# Patient Record
Sex: MALE | Race: WHITE
[De-identification: names, ages, dates, MRNs, and addresses within clinical notes are randomized per-mention and may not be internally consistent; named-entity substitution may affect disease eponyms.]

---

## 2019-07-23 ENCOUNTER — APPOINTMENT (OUTPATIENT)
Dept: ELECTROPHYSIOLOGY | Facility: CLINIC | Age: 74
End: 2019-07-23
Payer: MEDICARE

## 2019-07-23 VITALS
BODY MASS INDEX: 24.62 KG/M2 | HEIGHT: 70 IN | DIASTOLIC BLOOD PRESSURE: 73 MMHG | WEIGHT: 172 LBS | OXYGEN SATURATION: 98 % | HEART RATE: 76 BPM | SYSTOLIC BLOOD PRESSURE: 142 MMHG

## 2019-07-23 DIAGNOSIS — F17.200 NICOTINE DEPENDENCE, UNSPECIFIED, UNCOMPLICATED: ICD-10-CM

## 2019-07-23 DIAGNOSIS — M32.9 SYSTEMIC LUPUS ERYTHEMATOSUS, UNSPECIFIED: ICD-10-CM

## 2019-07-23 DIAGNOSIS — I48.0 PAROXYSMAL ATRIAL FIBRILLATION: ICD-10-CM
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
## 2019-07-23 DIAGNOSIS — Z82.49 FAMILY HISTORY OF ISCHEMIC HEART DISEASE AND OTHER DISEASES OF THE CIRCULATORY SYSTEM: ICD-10-CM

## 2019-07-23 DIAGNOSIS — Z78.9 OTHER SPECIFIED HEALTH STATUS: ICD-10-CM

## 2019-07-23 DIAGNOSIS — R42 DIZZINESS AND GIDDINESS: ICD-10-CM

## 2019-07-23 DIAGNOSIS — I49.8 OTHER SPECIFIED CARDIAC ARRHYTHMIAS: ICD-10-CM

## 2019-07-23 PROCEDURE — 93000 ELECTROCARDIOGRAM COMPLETE: CPT

## 2019-07-23 PROCEDURE — 99205 OFFICE O/P NEW HI 60 MIN: CPT

## 2019-07-23 RX ORDER — LEVETIRACETAM 500 MG/1
500 TABLET, FILM COATED ORAL
Qty: 14 | Refills: 0 | Status: ACTIVE | COMMUNITY

## 2019-07-23 RX ORDER — VALSARTAN 80 MG/1
80 TABLET, COATED ORAL DAILY
Qty: 90 | Refills: 3 | Status: ACTIVE | COMMUNITY

## 2019-07-23 RX ORDER — HYDROXYCHLOROQUINE SULFATE 200 MG/1
200 TABLET, FILM COATED ORAL DAILY
Refills: 0 | Status: ACTIVE | COMMUNITY

## 2019-07-23 NOTE — REASON FOR VISIT
[Consultation] : a consultation regarding [Atrial Fibrillation] : atrial fibrillation [FreeTextEntry1] : Watchman device evaluation

## 2019-07-23 NOTE — PHYSICAL EXAM
[General Appearance - Well Nourished] : well nourished [Normal Conjunctiva] : the conjunctiva exhibited no abnormalities [General Appearance - Well Developed] : well developed [Normal Oral Mucosa] : normal oral mucosa [Normal Oropharynx] : normal oropharynx [Heart Sounds] : normal S1 and S2 [Heart Rate And Rhythm] : heart rate and rhythm were normal [Murmurs] : no murmurs present [Edema] : no peripheral edema present [Respiration, Rhythm And Depth] : normal respiratory rhythm and effort [Bibasilar Rales/Crackles] : bibasilar rales [Abdomen Soft] : soft [Bowel Sounds] : normal bowel sounds [Abdomen Tenderness] : non-tender [Abnormal Walk] : normal gait [Nail Clubbing] : no clubbing of the fingernails [Skin Color & Pigmentation] : normal skin color and pigmentation [Cyanosis, Localized] : no localized cyanosis [Skin Turgor] : normal skin turgor [] : no rash [Affect] : the affect was normal [Impaired Insight] : insight and judgment were intact [Oriented To Time, Place, And Person] : oriented to person, place, and time [Mood] : the mood was normal [No Anxiety] : not feeling anxious [FreeTextEntry1] : No JVD

## 2019-07-23 NOTE — HISTORY OF PRESENT ILLNESS
[FreeTextEntry1] : 75 y/o male with history of AAA s/p grafting, cerebral aneurysm that ruptured in 2006 and second non-ruptured aneurysm noted at that time, cardiomyopathy, BPH, CKD, recurrent falls/syncope s/p ILR placement in 2017, and pAfib who presents today for evaluation for watchman placement. \par \par The patient is followed by Dr. Soto and Dr Leal. They felt that he was not a good long term AC candidate given his recurrent falls, 4 over the last year, and cerebral aneurysms. The patient states that he only takes an ASA. He does not recall being on AC. He denies a history of bleeding. He states he was falling often. The falls were all similar, occur when he was standing or walking, felt like a light bulb would go out and he would fall. when he would reach the ground he would feel fine. This resulted in an ILR being placed, His ILR interrogation today shows a 16 hour episode of atrial fibrillation. The patient denies any symptoms of CP, SOB, MOLINA, or palpitations. He does not feel his afib.

## 2019-07-23 NOTE — ASSESSMENT
[FreeTextEntry1] : IMPRESSION: \par 73 y/o male with history of AAA s/p grafting, cerebral aneurysm that ruptured in 2006 and second non-ruptured aneurysm noted at that time, cardiomyopathy, BPH, CKD, recurrent falls/syncope s/p ILR placement in 2017, and pAfib who presents today for evaluation for Watchman placement. The patient is at risk for life altering bleeding on full AC long term given his falls and cerebral aneurysms. He would benefit form a Watchman device. 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 However, his ILR does not show any evidence of heart block. He is at increased risk of CHB in the future. \par \par PLAN:\par - Agree with Watchman as above. Screening BRANDO ordered to evaluate the DONNA. \par - Will proceed depending on BRANDO results.

## 2019-08-19 ENCOUNTER — OUTPATIENT (OUTPATIENT)
Dept: OUTPATIENT SERVICES | Facility: HOSPITAL | Age: 74
LOS: 1 days | End: 2019-08-19
Payer: MEDICARE

## 2019-08-19 ENCOUNTER — APPOINTMENT (OUTPATIENT)
Dept: CV DIAGNOSITCS | Facility: HOSPITAL | Age: 74
End: 2019-08-19

## 2019-08-19 DIAGNOSIS — I25.10 ATHEROSCLEROTIC HEART DISEASE OF NATIVE CORONARY ARTERY WITHOUT ANGINA PECTORIS: ICD-10-CM

## 2019-08-19 PROCEDURE — 93306 TTE W/DOPPLER COMPLETE: CPT | Mod: 26

## 2019-08-19 PROCEDURE — 93312 ECHO TRANSESOPHAGEAL: CPT

## 2019-08-19 PROCEDURE — 93312 ECHO TRANSESOPHAGEAL: CPT | Mod: 26

## 2019-08-19 PROCEDURE — 93306 TTE W/DOPPLER COMPLETE: CPT

## 2019-09-11 ENCOUNTER — APPOINTMENT (OUTPATIENT)
Dept: CV DIAGNOSITCS | Facility: HOSPITAL | Age: 74
End: 2019-09-11

## 2019-10-23 ENCOUNTER — APPOINTMENT (OUTPATIENT)
Dept: CV DIAGNOSITCS | Facility: HOSPITAL | Age: 74
End: 2019-10-23

## 2019-10-23 ENCOUNTER — TRANSCRIPTION ENCOUNTER (OUTPATIENT)
Age: 74
End: 2019-10-23

## 2019-10-23 ENCOUNTER — INPATIENT (INPATIENT)
Facility: HOSPITAL | Age: 74
LOS: 0 days | Discharge: HOME | DRG: 274 | End: 2019-10-24
Attending: INTERNAL MEDICINE | Admitting: INTERNAL MEDICINE
Payer: MEDICARE

## 2019-10-23 VITALS
WEIGHT: 175.05 LBS | TEMPERATURE: 98 F | OXYGEN SATURATION: 98 % | HEIGHT: 71 IN | RESPIRATION RATE: 16 BRPM | SYSTOLIC BLOOD PRESSURE: 213 MMHG | DIASTOLIC BLOOD PRESSURE: 98 MMHG

## 2019-10-23 DIAGNOSIS — I48.91 UNSPECIFIED ATRIAL FIBRILLATION: ICD-10-CM

## 2019-10-23 LAB
ANION GAP SERPL CALC-SCNC: 11 MMOL/L — SIGNIFICANT CHANGE UP (ref 5–17)
APTT BLD: 33.6 SEC — SIGNIFICANT CHANGE UP (ref 27.5–36.3)
BLD GP AB SCN SERPL QL: NEGATIVE — SIGNIFICANT CHANGE UP
BUN SERPL-MCNC: 37 MG/DL — HIGH (ref 7–23)
CALCIUM SERPL-MCNC: 9.4 MG/DL — SIGNIFICANT CHANGE UP (ref 8.4–10.5)
CHLORIDE SERPL-SCNC: 103 MMOL/L — SIGNIFICANT CHANGE UP (ref 96–108)
CO2 SERPL-SCNC: 28 MMOL/L — SIGNIFICANT CHANGE UP (ref 22–31)
CREAT SERPL-MCNC: 1.34 MG/DL — HIGH (ref 0.5–1.3)
GLUCOSE SERPL-MCNC: 81 MG/DL — SIGNIFICANT CHANGE UP (ref 70–99)
HCT VFR BLD CALC: 38 % — LOW (ref 39–50)
HGB BLD-MCNC: 12.5 G/DL — LOW (ref 13–17)
INR BLD: 1.25 RATIO — HIGH (ref 0.88–1.16)
MCHC RBC-ENTMCNC: 30.3 PG — SIGNIFICANT CHANGE UP (ref 27–34)
MCHC RBC-ENTMCNC: 32.9 GM/DL — SIGNIFICANT CHANGE UP (ref 32–36)
MCV RBC AUTO: 92 FL — SIGNIFICANT CHANGE UP (ref 80–100)
NRBC # BLD: 0 /100 WBCS — SIGNIFICANT CHANGE UP (ref 0–0)
PLATELET # BLD AUTO: 203 K/UL — SIGNIFICANT CHANGE UP (ref 150–400)
POTASSIUM SERPL-MCNC: 3.8 MMOL/L — SIGNIFICANT CHANGE UP (ref 3.5–5.3)
POTASSIUM SERPL-SCNC: 3.8 MMOL/L — SIGNIFICANT CHANGE UP (ref 3.5–5.3)
PROTHROM AB SERPL-ACNC: 14.3 SEC — HIGH (ref 10–12.9)
RBC # BLD: 4.13 M/UL — LOW (ref 4.2–5.8)
RBC # FLD: 12.8 % — SIGNIFICANT CHANGE UP (ref 10.3–14.5)
RH IG SCN BLD-IMP: POSITIVE — SIGNIFICANT CHANGE UP
SODIUM SERPL-SCNC: 142 MMOL/L — SIGNIFICANT CHANGE UP (ref 135–145)
WBC # BLD: 7.32 K/UL — SIGNIFICANT CHANGE UP (ref 3.8–10.5)
WBC # FLD AUTO: 7.32 K/UL — SIGNIFICANT CHANGE UP (ref 3.8–10.5)

## 2019-10-23 PROCEDURE — 93355 ECHO TRANSESOPHAGEAL (TEE): CPT

## 2019-10-23 PROCEDURE — 93010 ELECTROCARDIOGRAM REPORT: CPT | Mod: 76

## 2019-10-23 PROCEDURE — 33340 PERQ CLSR TCAT L ATR APNDGE: CPT | Mod: Q0

## 2019-10-23 RX ORDER — VALSARTAN 80 MG/1
1 TABLET ORAL
Qty: 0 | Refills: 0 | DISCHARGE

## 2019-10-23 RX ORDER — HYDROXYCHLOROQUINE SULFATE 200 MG
1 TABLET ORAL
Qty: 0 | Refills: 0 | DISCHARGE

## 2019-10-23 RX ORDER — METOPROLOL TARTRATE 50 MG
1 TABLET ORAL
Qty: 0 | Refills: 0 | DISCHARGE

## 2019-10-23 RX ORDER — ALPRAZOLAM 0.25 MG
0.5 TABLET ORAL
Qty: 0 | Refills: 0 | DISCHARGE

## 2019-10-23 RX ORDER — OMEPRAZOLE 10 MG/1
1 CAPSULE, DELAYED RELEASE ORAL
Qty: 0 | Refills: 0 | DISCHARGE

## 2019-10-23 RX ORDER — LOSARTAN POTASSIUM 100 MG/1
50 TABLET, FILM COATED ORAL DAILY
Refills: 0 | Status: DISCONTINUED | OUTPATIENT
Start: 2019-10-23 | End: 2019-10-24

## 2019-10-23 RX ORDER — ASPIRIN/CALCIUM CARB/MAGNESIUM 324 MG
325 TABLET ORAL DAILY
Refills: 0 | Status: DISCONTINUED | OUTPATIENT
Start: 2019-10-23 | End: 2019-10-24

## 2019-10-23 RX ORDER — APIXABAN 2.5 MG/1
1 TABLET, FILM COATED ORAL
Qty: 60 | Refills: 1
Start: 2019-10-23 | End: 2019-12-21

## 2019-10-23 RX ORDER — NITROFURANTOIN MACROCRYSTAL 50 MG
100 CAPSULE ORAL
Refills: 0 | Status: DISCONTINUED | OUTPATIENT
Start: 2019-10-23 | End: 2019-10-24

## 2019-10-23 RX ORDER — ZOLPIDEM TARTRATE 10 MG/1
1 TABLET ORAL
Qty: 0 | Refills: 0 | DISCHARGE

## 2019-10-23 RX ORDER — INFLUENZA VIRUS VACCINE 15; 15; 15; 15 UG/.5ML; UG/.5ML; UG/.5ML; UG/.5ML
0.5 SUSPENSION INTRAMUSCULAR ONCE
Refills: 0 | Status: COMPLETED | OUTPATIENT
Start: 2019-10-23 | End: 2019-10-23

## 2019-10-23 RX ORDER — METOPROLOL TARTRATE 50 MG
25 TABLET ORAL DAILY
Refills: 0 | Status: DISCONTINUED | OUTPATIENT
Start: 2019-10-23 | End: 2019-10-24

## 2019-10-23 RX ORDER — LEVETIRACETAM 250 MG/1
500 TABLET, FILM COATED ORAL
Refills: 0 | Status: DISCONTINUED | OUTPATIENT
Start: 2019-10-23 | End: 2019-10-24

## 2019-10-23 RX ORDER — FINASTERIDE 5 MG/1
1 TABLET, FILM COATED ORAL
Qty: 0 | Refills: 0 | DISCHARGE

## 2019-10-23 RX ORDER — PANTOPRAZOLE SODIUM 20 MG/1
40 TABLET, DELAYED RELEASE ORAL
Refills: 0 | Status: DISCONTINUED | OUTPATIENT
Start: 2019-10-23 | End: 2019-10-24

## 2019-10-23 RX ORDER — APIXABAN 2.5 MG/1
5 TABLET, FILM COATED ORAL EVERY 12 HOURS
Refills: 0 | Status: DISCONTINUED | OUTPATIENT
Start: 2019-10-23 | End: 2019-10-24

## 2019-10-23 RX ORDER — NITROFURANTOIN MACROCRYSTAL 50 MG
1 CAPSULE ORAL
Qty: 0 | Refills: 0 | DISCHARGE

## 2019-10-23 RX ORDER — ZOLPIDEM TARTRATE 10 MG/1
5 TABLET ORAL AT BEDTIME
Refills: 0 | Status: DISCONTINUED | OUTPATIENT
Start: 2019-10-23 | End: 2019-10-24

## 2019-10-23 RX ORDER — LEVETIRACETAM 250 MG/1
1 TABLET, FILM COATED ORAL
Qty: 0 | Refills: 0 | DISCHARGE

## 2019-10-23 RX ORDER — ALPRAZOLAM 0.25 MG
0.5 TABLET ORAL EVERY 12 HOURS
Refills: 0 | Status: DISCONTINUED | OUTPATIENT
Start: 2019-10-23 | End: 2019-10-24

## 2019-10-23 RX ORDER — FINASTERIDE 5 MG/1
5 TABLET, FILM COATED ORAL DAILY
Refills: 0 | Status: DISCONTINUED | OUTPATIENT
Start: 2019-10-23 | End: 2019-10-24

## 2019-10-23 RX ORDER — HYDROXYCHLOROQUINE SULFATE 200 MG
200 TABLET ORAL DAILY
Refills: 0 | Status: DISCONTINUED | OUTPATIENT
Start: 2019-10-23 | End: 2019-10-24

## 2019-10-23 RX ADMIN — LEVETIRACETAM 500 MILLIGRAM(S): 250 TABLET, FILM COATED ORAL at 20:18

## 2019-10-23 RX ADMIN — Medication 100 MILLIGRAM(S): at 20:18

## 2019-10-23 RX ADMIN — Medication 325 MILLIGRAM(S): at 22:44

## 2019-10-23 RX ADMIN — Medication 200 MILLIGRAM(S): at 20:18

## 2019-10-23 RX ADMIN — LOSARTAN POTASSIUM 50 MILLIGRAM(S): 100 TABLET, FILM COATED ORAL at 20:39

## 2019-10-23 RX ADMIN — APIXABAN 5 MILLIGRAM(S): 2.5 TABLET, FILM COATED ORAL at 22:44

## 2019-10-23 RX ADMIN — Medication 25 MILLIGRAM(S): at 20:39

## 2019-10-23 RX ADMIN — FINASTERIDE 5 MILLIGRAM(S): 5 TABLET, FILM COATED ORAL at 20:39

## 2019-10-23 RX ADMIN — ZOLPIDEM TARTRATE 5 MILLIGRAM(S): 10 TABLET ORAL at 23:21

## 2019-10-23 NOTE — DISCHARGE NOTE PROVIDER - HOSPITAL COURSE
HPI:    74 year old  male with ILR 2017 for syncope, frequent falls,  aneurysms: intracranial s/p repair for rupture '06, in additionn a non ruptured noted at that time and aortic s/p grafting, SLE, newly diagnosed afib (found on ILR interrogation 7/23/19, cardiomyopathy, BPH, CKD, RBBB and LAFB(no heart block noted on ILR interrogation).        10/23 s/p Watchman procedure. Right groin site without swelling, bleeding.

## 2019-10-23 NOTE — PRE-ANESTHESIA EVALUATION ADULT - NSANTHOSAYNRD_GEN_A_CORE
No. ANAMARIA screening performed.  STOP BANG Legend: 0-2 = LOW Risk; 3-4 = INTERMEDIATE Risk; 5-8 = HIGH Risk

## 2019-10-23 NOTE — H&P CARDIOLOGY - FAMILY HISTORY
Father  Still living? No  Family history of heart attack, Age at diagnosis: Age Unknown     Sibling  Still living? Yes, Estimated age: Age Unknown  Family history of heart disease, Age at diagnosis: Age Unknown

## 2019-10-23 NOTE — PROGRESS NOTE ADULT - SUBJECTIVE AND OBJECTIVE BOX
DOMINICK VARGAS  [id number]    PROCEDURE:  Watchman Device placement  EPS          INDICATION:  AF        ELECTROPHYSIOLOGIST(S):  MD Garrett Tapia MD          FINDINGS:  - Successful Watchman placement  - HV 56 ms  - AV Wenckebach less than 400 ms      COMPLICATIONS:  None      RECOMMENDATIONS:  - 6 hours bedred  - Stitch placed in right groin, to be removed in 6-8 hours

## 2019-10-23 NOTE — H&P CARDIOLOGY - PSH
Prostate Biopsy  6/2011. benign  S/P Appendectomy  childhood  S/P Clipping Ruptured  Cerebral Aneurysm  ' 2006  S/P Right Knee Arthrooscopy  ' 1984 and ' 1985

## 2019-10-23 NOTE — H&P CARDIOLOGY - PMH
Anxiety    Barretts Esophagus    Cerebral Aneurysm ( Recurrent)  dx: 7/ 2011  Discoid Lupus    Elevated PSA    GERD (Gastroesophageal Reflux Disease)    HTN (Hypertension)    Insomnia    Medial Meniscus Tear ( Right )  ' 84 and ' 85  Ruptured Cerebral Aneurysm  ' 2006  Seizure X 1 episode  7/2011

## 2019-10-23 NOTE — DISCHARGE NOTE PROVIDER - NSDCPNSUBOBJ_GEN_ALL_CORE
WATCHMAN DEVICE INSERTION    Monitor groin site for swelling, bleeding    Continue ASA, Plavix         ATRIAL FIBRILLATION    Serial EKG    Telemetry monitoring    Continue Eliquis Patient is a 74y old  Male who presents with a chief complaint of Frequent falls (23 Oct 2019 20:02).                    Allergies        No Known Allergies        Intolerances                Medications:    ALPRAZolam 0.5 milliGRAM(s) Oral every 12 hours PRN    apixaban 5 milliGRAM(s) Oral every 12 hours    aspirin enteric coated 325 milliGRAM(s) Oral daily    finasteride 5 milliGRAM(s) Oral daily    hydroxychloroquine 200 milliGRAM(s) Oral daily    influenza   Vaccine 0.5 milliLiter(s) IntraMuscular once    levETIRAcetam 500 milliGRAM(s) Oral two times a day    losartan 50 milliGRAM(s) Oral daily    metoprolol succinate ER 25 milliGRAM(s) Oral daily    nitrofurantoin monohydrate/macrocrystals (MACROBID) 100 milliGRAM(s) Oral <User Schedule>    pantoprazole    Tablet 40 milliGRAM(s) Oral before breakfast    zolpidem 5 milliGRAM(s) Oral at bedtime PRN    zolpidem 5 milliGRAM(s) Oral at bedtime PRN            Vitals:    T(C): 36.9 (10-23-19 @ 19:50), Max: 36.9 (10-23-19 @ 18:50)    HR: 81 (10-24-19 @ 00:50) (81 - 104)    BP: 142/53 (10-24-19 @ 00:50) (113/55 - 213/98)    BP(mean): 136 (10-23-19 @ 12:30) (136 - 136)    RR: 18 (10-24-19 @ 00:50) (16 - 18)    SpO2: 96% (10-24-19 @ 00:50) (90% - 98%)    Wt(kg): --    Daily Height in cm: 180.34 (23 Oct 2019 12:30)      Daily Weight in k.4 (23 Oct 2019 12:30)    I&O's Summary        23 Oct 2019 07:01  -  24 Oct 2019 05:12    --------------------------------------------------------    IN: 1200 mL / OUT: 300 mL / NET: 900 mL                    Physical Exam:    Appearance: Normal    Eyes: PERRL, EOMI    HENT: Normal oral muscosa, NC/AT    Cardiovascular: S1S2, RRR, No M/R/G, no JVD, No Lower extremity edema    Procedural Access Site: No hematoma, Non-tender to palpation, 2+ pulse, No bruit, No Ecchymosis    Respiratory: Clear to auscultation bilaterally    Gastrointestinal: Soft, Non tender, Normal Bowel Sounds    Musculoskeletal: No clubbing, No joint deformity     Neurologic: Non-focal    Lymphatic: No lymphadenopathy    Psychiatry: AAOx3, Mood & affect appropriate    Skin: No rashes, No ecchymoses, No cyanosis        1024        138  |  100  |  42<H>    ----------------------------<  152<H>    3.7   |  25  |  1.38<H>        Ca    8.4      24 Oct 2019 02:16            PT/INR - ( 24 Oct 2019 02:16 )   PT: 16.1 sec;   INR: 1.40 ratio               PTT - ( 24 Oct 2019 02:16 )  PTT:32.3 sec                Lipid panel     Hgb A1c                             10.9     9.27  )-----------( 191      ( 24 Oct 2019 02:16 )               31.7                 ECG: SR 82 bpm        Electrophysiology: s/p Watchman        Imaging:        Interpretation of Telemetry:            WATCHMAN DEVICE INSERTION    Monitor groin site for swelling, bleeding    Continue ASA, Plavix         ATRIAL FIBRILLATION    Serial EKG    Telemetry monitoring    Continue Eliquis

## 2019-10-23 NOTE — DISCHARGE NOTE PROVIDER - CARE PROVIDER_API CALL
Uli Tapia)  Cardiac Electrophysiology; Cardiology  [address] [place] [address]  Phone: [phone]  Fax: [phone]  Follow Up Time:

## 2019-10-23 NOTE — DISCHARGE NOTE PROVIDER - NSDCCPTREATMENT_GEN_ALL_CORE_FT
PRINCIPAL PROCEDURE  Procedure: Insertion, Watchman left atrial appendage closure device  Findings and Treatment: Watchman device insertion

## 2019-10-23 NOTE — H&P CARDIOLOGY - HISTORY OF PRESENT ILLNESS
74 year old  male with ILR 2017 for syncope, frequent falls,  aneurysms: intracranial s/p repair for rupture '06, in additionn a non ruptured noted at that time and aortic s/p grafting, SLE, newly diagnosed afib (found on ILR interrogation 7/23/19, cardiomyopathy, BPH, CKD, RBBB and LAFB(no heart block noted on ILR interrogation).  Pt underwent BRANDO 8/19/19 that revealed:   1. Mitral annular calcification. Thickened mitral leaflets.  Minimal mitral regurgitation. Mean transmitral valve  gradient equals 2 mm Hg. (HRabout 70 bpm)  2. Calcified trileaflet aortic valve with normal opening.  Minimal aortic regurgitation.  3. Complex atheroma noted in the descending aorta. Simple  atheroma noted in the descending aorta.  4. Normal left atrium.  LA volume index = 32 cc/m2. No left  atrial or left atrial appendage thrombus. Normal left  atrial appendage function (maximum velocityabout 50 cm/s).  Appendage measurements:  0 degrees: width 2.0 cm, depth 2.1 cm  45 degrees: width 1.5 cm, depth 2.0 cm  90 degrees: width 1.6 cm, depth 2.1 cm  135 degrees: width 2.1 cm, depth 1.6 cm  5. Borderline/mild global left ventricular systolic  dysfunction.  6. Mild diastolic dysfunction (Stage I).  7. Normal right ventricular size and function.  Pt presents today for Watchman as pt is at risk for falls in the setting of long term AC. 74 year old  male with ILR 2017 for syncope, frequent falls,  aneurysms: intracranial s/p repair for rupture '06, in additionn a non ruptured noted at that time and aortic s/p grafting, SLE, newly diagnosed afib (found on ILR interrogation 7/23/19, cardiomyopathy, BPH, CKD, RBBB and LAFB(no heart block noted on ILR interrogation).    Pt underwent BRANDO 8/19/19 that revealed:   1. Mitral annular calcification. Thickened mitral leaflets.  Minimal mitral regurgitation. Mean transmitral valve  gradient equals 2 mm Hg. (HRabout 70 bpm)  2. Calcified trileaflet aortic valve with normal opening.  Minimal aortic regurgitation.  3. Complex atheroma noted in the descending aorta. Simple  atheroma noted in the descending aorta.  4. Normal left atrium.  LA volume index = 32 cc/m2. No left  atrial or left atrial appendage thrombus. Normal left  atrial appendage function (maximum velocityabout 50 cm/s).  Appendage measurements:  0 degrees: width 2.0 cm, depth 2.1 cm  45 degrees: width 1.5 cm, depth 2.0 cm  90 degrees: width 1.6 cm, depth 2.1 cm  135 degrees: width 2.1 cm, depth 1.6 cm  5. Borderline/mild global left ventricular systolic  dysfunction.  6. Mild diastolic dysfunction (Stage I).  7. Normal right ventricular size and function.    Pt presents today for Watchman as pt is at risk for falls in the setting of long term AC. 74 year old  male with ILR 2017 for syncope, frequent falls,  aneurysms: intracranial s/p repair for rupture '06, in additionn a non ruptured noted at that time and aortic s/p grafting, SLE, newly diagnosed afib (found on ILR interrogation 7/23/19, cardiomyopathy, BPH, CKD, RBBB and LAFB(no heart block noted on ILR interrogation).    Pt underwent BRANDO 8/19/19 that revealed:   1. Mitral annular calcification. Thickened mitral leaflets.  Minimal mitral regurgitation. Mean transmitral valve  gradient equals 2 mm Hg. (HRabout 70 bpm)  2. Calcified trileaflet aortic valve with normal opening.  Minimal aortic regurgitation.  3. Complex atheroma noted in the descending aorta. Simple  atheroma noted in the descending aorta.  4. Normal left atrium.  LA volume index = 32 cc/m2. No left  atrial or left atrial appendage thrombus. Normal left  atrial appendage function (maximum velocityabout 50 cm/s).  Appendage measurements:  0 degrees: width 2.0 cm, depth 2.1 cm  45 degrees: width 1.5 cm, depth 2.0 cm  90 degrees: width 1.6 cm, depth 2.1 cm  135 degrees: width 2.1 cm, depth 1.6 cm  5. Borderline/mild global left ventricular systolic  dysfunction.  6. Mild diastolic dysfunction (Stage I).  7. Normal right ventricular size and function.    Pt presents today for Watchman as pt is not a good candidate for long term AC in the setting of frequent falls.

## 2019-10-24 ENCOUNTER — TRANSCRIPTION ENCOUNTER (OUTPATIENT)
Age: 74
End: 2019-10-24

## 2019-10-24 VITALS
OXYGEN SATURATION: 98 % | HEART RATE: 80 BPM | SYSTOLIC BLOOD PRESSURE: 145 MMHG | RESPIRATION RATE: 17 BRPM | TEMPERATURE: 99 F | DIASTOLIC BLOOD PRESSURE: 60 MMHG

## 2019-10-24 LAB
ANION GAP SERPL CALC-SCNC: 13 MMOL/L — SIGNIFICANT CHANGE UP (ref 5–17)
APTT BLD: 32.3 SEC — SIGNIFICANT CHANGE UP (ref 27.5–36.3)
BASOPHILS # BLD AUTO: 0.06 K/UL — SIGNIFICANT CHANGE UP (ref 0–0.2)
BASOPHILS NFR BLD AUTO: 0.6 % — SIGNIFICANT CHANGE UP (ref 0–2)
BUN SERPL-MCNC: 42 MG/DL — HIGH (ref 7–23)
CALCIUM SERPL-MCNC: 8.4 MG/DL — SIGNIFICANT CHANGE UP (ref 8.4–10.5)
CHLORIDE SERPL-SCNC: 100 MMOL/L — SIGNIFICANT CHANGE UP (ref 96–108)
CO2 SERPL-SCNC: 25 MMOL/L — SIGNIFICANT CHANGE UP (ref 22–31)
CREAT SERPL-MCNC: 1.38 MG/DL — HIGH (ref 0.5–1.3)
EOSINOPHIL # BLD AUTO: 0.09 K/UL — SIGNIFICANT CHANGE UP (ref 0–0.5)
EOSINOPHIL NFR BLD AUTO: 1 % — SIGNIFICANT CHANGE UP (ref 0–6)
GLUCOSE SERPL-MCNC: 152 MG/DL — HIGH (ref 70–99)
HCT VFR BLD CALC: 31.7 % — LOW (ref 39–50)
HGB BLD-MCNC: 10.9 G/DL — LOW (ref 13–17)
IMM GRANULOCYTES NFR BLD AUTO: 0.8 % — SIGNIFICANT CHANGE UP (ref 0–1.5)
INR BLD: 1.4 RATIO — HIGH (ref 0.88–1.16)
LYMPHOCYTES # BLD AUTO: 1.19 K/UL — SIGNIFICANT CHANGE UP (ref 1–3.3)
LYMPHOCYTES # BLD AUTO: 12.8 % — LOW (ref 13–44)
MCHC RBC-ENTMCNC: 31.5 PG — SIGNIFICANT CHANGE UP (ref 27–34)
MCHC RBC-ENTMCNC: 34.4 GM/DL — SIGNIFICANT CHANGE UP (ref 32–36)
MCV RBC AUTO: 91.6 FL — SIGNIFICANT CHANGE UP (ref 80–100)
MONOCYTES # BLD AUTO: 0.89 K/UL — SIGNIFICANT CHANGE UP (ref 0–0.9)
MONOCYTES NFR BLD AUTO: 9.6 % — SIGNIFICANT CHANGE UP (ref 2–14)
NEUTROPHILS # BLD AUTO: 6.97 K/UL — SIGNIFICANT CHANGE UP (ref 1.8–7.4)
NEUTROPHILS NFR BLD AUTO: 75.2 % — SIGNIFICANT CHANGE UP (ref 43–77)
NRBC # BLD: 0 /100 WBCS — SIGNIFICANT CHANGE UP (ref 0–0)
PLATELET # BLD AUTO: 191 K/UL — SIGNIFICANT CHANGE UP (ref 150–400)
POTASSIUM SERPL-MCNC: 3.7 MMOL/L — SIGNIFICANT CHANGE UP (ref 3.5–5.3)
POTASSIUM SERPL-SCNC: 3.7 MMOL/L — SIGNIFICANT CHANGE UP (ref 3.5–5.3)
PROTHROM AB SERPL-ACNC: 16.1 SEC — HIGH (ref 10–12.9)
RBC # BLD: 3.46 M/UL — LOW (ref 4.2–5.8)
RBC # FLD: 12.8 % — SIGNIFICANT CHANGE UP (ref 10.3–14.5)
SODIUM SERPL-SCNC: 138 MMOL/L — SIGNIFICANT CHANGE UP (ref 135–145)
WBC # BLD: 9.27 K/UL — SIGNIFICANT CHANGE UP (ref 3.8–10.5)
WBC # FLD AUTO: 9.27 K/UL — SIGNIFICANT CHANGE UP (ref 3.8–10.5)

## 2019-10-24 PROCEDURE — 93010 ELECTROCARDIOGRAM REPORT: CPT

## 2019-10-24 PROCEDURE — 71046 X-RAY EXAM CHEST 2 VIEWS: CPT | Mod: 26

## 2019-10-24 PROCEDURE — 99238 HOSP IP/OBS DSCHRG MGMT 30/<: CPT

## 2019-10-24 RX ORDER — BENZOCAINE AND MENTHOL 5; 1 G/100ML; G/100ML
1 LIQUID ORAL ONCE
Refills: 0 | Status: COMPLETED | OUTPATIENT
Start: 2019-10-24 | End: 2019-10-24

## 2019-10-24 RX ORDER — ASPIRIN/CALCIUM CARB/MAGNESIUM 324 MG
1 TABLET ORAL
Qty: 0 | Refills: 0 | DISCHARGE

## 2019-10-24 RX ADMIN — APIXABAN 5 MILLIGRAM(S): 2.5 TABLET, FILM COATED ORAL at 10:03

## 2019-10-24 RX ADMIN — FINASTERIDE 5 MILLIGRAM(S): 5 TABLET, FILM COATED ORAL at 12:03

## 2019-10-24 RX ADMIN — LEVETIRACETAM 500 MILLIGRAM(S): 250 TABLET, FILM COATED ORAL at 05:36

## 2019-10-24 RX ADMIN — Medication 200 MILLIGRAM(S): at 12:03

## 2019-10-24 RX ADMIN — Medication 100 MILLIGRAM(S): at 12:03

## 2019-10-24 RX ADMIN — Medication 325 MILLIGRAM(S): at 12:03

## 2019-10-24 RX ADMIN — Medication 25 MILLIGRAM(S): at 05:37

## 2019-10-24 RX ADMIN — BENZOCAINE AND MENTHOL 1 LOZENGE: 5; 1 LIQUID ORAL at 08:00

## 2019-10-24 RX ADMIN — Medication 0.5 MILLIGRAM(S): at 01:44

## 2019-10-24 RX ADMIN — PANTOPRAZOLE SODIUM 40 MILLIGRAM(S): 20 TABLET, DELAYED RELEASE ORAL at 05:37

## 2019-10-24 RX ADMIN — LOSARTAN POTASSIUM 50 MILLIGRAM(S): 100 TABLET, FILM COATED ORAL at 05:37

## 2019-10-24 NOTE — CHART NOTE - NSCHARTNOTEFT_GEN_A_CORE
SPoke with MD Tapia today and he looked at CXR clear lungs Ok to be discharged home now . VSS as per flow sheet Pt wife at bedside Verbalized understanding .

## 2019-10-24 NOTE — DISCHARGE NOTE NURSING/CASE MANAGEMENT/SOCIAL WORK - NSDCPEWEB_GEN_ALL_CORE
[facility] for Tobacco Control website --- [email or web address] website --- [email or web address]

## 2019-10-24 NOTE — DISCHARGE NOTE NURSING/CASE MANAGEMENT/SOCIAL WORK - NSDCPEEMAIL_GEN_ALL_CORE
[facility] for Tobacco Control email [email or web address]

## 2019-10-24 NOTE — CONSULT NOTE ADULT - ASSESSMENT
74 year old  male with ILR 2017 for syncope, frequent falls,  aneurysms: intracranial s/p repair for rupture '06, in addition, aortic s/p grafting, SLE, newly diagnosed afib (found on ILR interrogation 7/23/19, cardiomyopathy, BPH, CKD, RBBB and LAFB (no heart block noted on ILR interrogation).  S/p WATCHMAN and doing well   On Apixaban ASA and BB  Plan per EPS regarding eventual DC in the future of AC  Stable from CV standpoint. Appreciate Dr Tapia's care and follow up

## 2019-10-24 NOTE — DISCHARGE NOTE NURSING/CASE MANAGEMENT/SOCIAL WORK - PATIENT PORTAL LINK FT
You can access the FollowMyHealth Patient Portal offered by [facility] by registering at the following website: [email or web address]. By joining [employer]’s FollowMyHealth portal, you will also be able to view your health information using other applications (apps) compatible with our system.

## 2019-10-24 NOTE — CONSULT NOTE ADULT - SUBJECTIVE AND OBJECTIVE BOX
CHIEF COMPLAINT: Chest Pain    HPI:  74 year old  male with ILR 2017 for syncope, frequent falls,  aneurysms: intracranial s/p repair for rupture '06, in additionn a non ruptured noted at that time and aortic s/p grafting, SLE, newly diagnosed afib (found on ILR interrogation 19, cardiomyopathy, BPH, CKD, RBBB and LAFB(no heart block noted on ILR interrogation).    Pt underwent BRANDO 19 that revealed:   1. Mitral annular calcification. Thickened mitral leaflets.  Minimal mitral regurgitation. Mean transmitral valve  gradient equals 2 mm Hg. (HRabout 70 bpm)  2. Calcified trileaflet aortic valve with normal opening.  Minimal aortic regurgitation.  3. Complex atheroma noted in the descending aorta. Simple  atheroma noted in the descending aorta.  4. Normal left atrium.  LA volume index = 32 cc/m2. No left  atrial or left atrial appendage thrombus. Normal left  atrial appendage function (maximum velocityabout 50 cm/s).  Appendage measurements:  0 degrees: width 2.0 cm, depth 2.1 cm  45 degrees: width 1.5 cm, depth 2.0 cm  90 degrees: width 1.6 cm, depth 2.1 cm  135 degrees: width 2.1 cm, depth 1.6 cm  5. Borderline/mild global left ventricular systolic  dysfunction.  6. Mild diastolic dysfunction (Stage I).  7. Normal right ventricular size and function.    Pt presents today for Watchman as pt is not a good candidate for long term AC in the setting of frequent falls. (23 Oct 2019 12:30)    He is s/p WATCHMAN and doing well    PAST MEDICAL & SURGICAL HISTORY:  Discoid Lupus  Medial Meniscus Tear ( Right ): &#x27; 84 and &#x27; 85  Ruptured Cerebral Aneurysm: &#x27;   Cerebral Aneurysm ( Recurrent): dx: 2011  Insomnia  Anxiety  Elevated PSA  Seizure X 1 episode: 2011  GERD (Gastroesophageal Reflux Disease)  Barretts Esophagus  HTN (Hypertension)  S/P Clipping Ruptured  Cerebral Aneurysm: &#x27;   S/P Right Knee Arthrooscopy: &#x27;  and &#x27;   S/P Appendectomy: childhood  Prostate Biopsy: 2011. benign      Allergies    No Known Allergies    Intolerances        SOCIAL HISTORY    Smoking Hx:  ETOH Hx:  Marital Status:  Occupational Hx:    FAMILY HISTORY:  Family history of heart disease (Sibling)  Family history of heart attack (Father):  age 66      MEDICATIONS:  ALPRAZolam 0.5 milliGRAM(s) Oral every 12 hours PRN  apixaban 5 milliGRAM(s) Oral every 12 hours  aspirin enteric coated 325 milliGRAM(s) Oral daily  finasteride 5 milliGRAM(s) Oral daily  hydroxychloroquine 200 milliGRAM(s) Oral daily  levETIRAcetam 500 milliGRAM(s) Oral two times a day  losartan 50 milliGRAM(s) Oral daily  metoprolol succinate ER 25 milliGRAM(s) Oral daily  nitrofurantoin monohydrate/macrocrystals (MACROBID) 100 milliGRAM(s) Oral <User Schedule>  pantoprazole    Tablet 40 milliGRAM(s) Oral before breakfast  zolpidem 5 milliGRAM(s) Oral at bedtime PRN  zolpidem 5 milliGRAM(s) Oral at bedtime PRN      REVIEW OF SYSTEMS:    CONSTITUTIONAL: No weakness, fevers or chills  EYES/ENT: No visual changes;  No vertigo or throat pain   NECK: No pain or stiffness  RESPIRATORY: No cough, wheezing, hemoptysis; No shortness of breath  CARDIOVASCULAR: No chest pain or palpitations  GASTROINTESTINAL: No abdominal or epigastric pain. No nausea, vomiting, or hematemesis; No diarrhea or constipation. No melena or hematochezia.  GENITOURINARY: No dysuria, frequency or hematuria  NEUROLOGICAL: No numbness or weakness  SKIN: No itching, burning, rashes, or lesions   All other review of systems is negative unless indicated above    Vital Signs Last 24 Hrs  T(C): 36.4 (24 Oct 2019 04:50), Max: 36.9 (23 Oct 2019 18:50)  T(F): 97.5 (24 Oct 2019 04:50), Max: 98.5 (23 Oct 2019 19:50)  HR: 93 (24 Oct 2019 04:50) (81 - 104)  BP: 160/75 (24 Oct 2019 04:50) (113/55 - 213/98)  BP(mean): 136 (23 Oct 2019 12:30) (136 - 136)  RR: 17 (24 Oct 2019 04:50) (16 - 18)  SpO2: 97% (24 Oct 2019 04:50) (90% - 98%)    I&O's Summary    23 Oct 2019 07:  -  24 Oct 2019 07:00  --------------------------------------------------------  IN: 1200 mL / OUT: 500 mL / NET: 700 mL    24 Oct 2019 07:  -  24 Oct 2019 09:46  --------------------------------------------------------  IN: 150 mL / OUT: 0 mL / NET: 150 mL        PHYSICAL EXAM:    Constitutional: NAD, awake and alert, well-developed  HEENT: PERR, EOMI  Neck: soft and supple, No LAD, No JVD  Respiratory: Breath sounds are clear bilaterally, No wheezing, rales or rhonchi  Cardiovascular: Regular rate and rhythm, normal S1 and S2,  no murmurs, gallops or rubs  Gastrointestinal: Bowel Sounds present, soft, nontender.   Extremities: No peripheral edema. No clubbing or cyanosis.  Vascular: 2+ peripheral pulses  Neurological: A/O x 3, no focal deficits  Musculoskeletal: no calf tenderness.  Skin: No rashes.      LABS: All Labs Reviewed:                        10.9   9.27  )-----------( 191      ( 24 Oct 2019 02:16 )             31.7                         12.5   7.32  )-----------( 203      ( 23 Oct 2019 13:04 )             38.0     24 Oct 2019 02:16    138    |  100    |  42     ----------------------------<  152    3.7     |  25     |  1.38   23 Oct 2019 13:04    142    |  103    |  37     ----------------------------<  81     3.8     |  28     |  1.34     Ca    8.4        24 Oct 2019 02:16  Ca    9.4        23 Oct 2019 13:04      PT/INR - ( 24 Oct 2019 02:16 )   PT: 16.1 sec;   INR: 1.40 ratio         PTT - ( 24 Oct 2019 02:16 )  PTT:32.3 sec  Blood Culture:

## 2019-10-28 ENCOUNTER — INBOUND DOCUMENT (OUTPATIENT)
Age: 74
End: 2019-10-28

## 2019-11-12 PROCEDURE — C1889: CPT

## 2019-11-12 PROCEDURE — 85730 THROMBOPLASTIN TIME PARTIAL: CPT

## 2019-11-12 PROCEDURE — C1769: CPT

## 2019-11-12 PROCEDURE — C1894: CPT

## 2019-11-12 PROCEDURE — 86901 BLOOD TYPING SEROLOGIC RH(D): CPT

## 2019-11-12 PROCEDURE — C1893: CPT

## 2019-11-12 PROCEDURE — 85610 PROTHROMBIN TIME: CPT

## 2019-11-12 PROCEDURE — 71046 X-RAY EXAM CHEST 2 VIEWS: CPT

## 2019-11-12 PROCEDURE — 33340 PERQ CLSR TCAT L ATR APNDGE: CPT | Mod: Q0

## 2019-11-12 PROCEDURE — 93355 ECHO TRANSESOPHAGEAL (TEE): CPT

## 2019-11-12 PROCEDURE — 80048 BASIC METABOLIC PNL TOTAL CA: CPT

## 2019-11-12 PROCEDURE — C1730: CPT

## 2019-11-12 PROCEDURE — 86850 RBC ANTIBODY SCREEN: CPT

## 2019-11-12 PROCEDURE — 86900 BLOOD TYPING SEROLOGIC ABO: CPT

## 2019-11-12 PROCEDURE — 85027 COMPLETE CBC AUTOMATED: CPT

## 2019-11-12 PROCEDURE — 93005 ELECTROCARDIOGRAM TRACING: CPT

## 2019-12-09 ENCOUNTER — APPOINTMENT (OUTPATIENT)
Dept: CV DIAGNOSITCS | Facility: HOSPITAL | Age: 74
End: 2019-12-09

## 2019-12-09 ENCOUNTER — OUTPATIENT (OUTPATIENT)
Dept: OUTPATIENT SERVICES | Facility: HOSPITAL | Age: 74
LOS: 1 days | End: 2019-12-09
Payer: MEDICARE

## 2019-12-09 DIAGNOSIS — I48.0 PAROXYSMAL ATRIAL FIBRILLATION: ICD-10-CM

## 2019-12-09 PROCEDURE — 93312 ECHO TRANSESOPHAGEAL: CPT

## 2019-12-09 PROCEDURE — 93306 TTE W/DOPPLER COMPLETE: CPT

## 2019-12-09 PROCEDURE — 93312 ECHO TRANSESOPHAGEAL: CPT | Mod: 26

## 2019-12-09 PROCEDURE — 93306 TTE W/DOPPLER COMPLETE: CPT | Mod: 26

## 2019-12-09 RX ORDER — CLOPIDOGREL BISULFATE 75 MG/1
30 TABLET, FILM COATED ORAL
Qty: 120 | Refills: 3
Start: 2019-12-09 | End: 2020-04-06

## 2019-12-09 NOTE — CHART NOTE - NSCHARTNOTEFT_GEN_A_CORE
Pt is s/p BRANDO today    Dr. Tapia at bedside to review BRANDO results with patient:  Echo showed 3mm leak with smaller jets    Plan as discussed with Dr. Tapia  -STOP Eliquis  -Start ASA 325mg and Plavix 75mg for 4 1/2 months (to complete 6 month total course)  -Follow up with primary cardiologist  Pt verbalizes understanding. Will review with wife prior to pt's discharge.

## 2019-12-10 RX ORDER — ASPIRIN 325 MG/1
325 TABLET ORAL
Refills: 0 | Status: ACTIVE | COMMUNITY

## 2019-12-10 RX ORDER — CLOPIDOGREL BISULFATE 75 MG/1
75 TABLET, FILM COATED ORAL DAILY
Qty: 90 | Refills: 1 | Status: ACTIVE | COMMUNITY
Start: 1900-01-01 | End: 1900-01-01

## 2019-12-17 ENCOUNTER — OTHER (OUTPATIENT)
Age: 74
End: 2019-12-17

## 2020-06-01 ENCOUNTER — RX RENEWAL (OUTPATIENT)
Age: 75
End: 2020-06-01

## 2020-07-22 ENCOUNTER — APPOINTMENT (OUTPATIENT)
Dept: NUCLEAR MEDICINE | Facility: HOSPITAL | Age: 75
End: 2020-07-22
Payer: MEDICARE

## 2020-07-22 ENCOUNTER — OUTPATIENT (OUTPATIENT)
Dept: OUTPATIENT SERVICES | Facility: HOSPITAL | Age: 75
LOS: 1 days | End: 2020-07-22

## 2020-07-22 DIAGNOSIS — G20 PARKINSON'S DISEASE: ICD-10-CM

## 2020-07-22 PROCEDURE — 78803 RP LOCLZJ TUM SPECT 1 AREA: CPT | Mod: 26

## 2020-09-03 ENCOUNTER — APPOINTMENT (OUTPATIENT)
Dept: ELECTROPHYSIOLOGY | Facility: CLINIC | Age: 75
End: 2020-09-03

## 2020-12-02 ENCOUNTER — NON-APPOINTMENT (OUTPATIENT)
Age: 75
End: 2020-12-02
